# Patient Record
Sex: FEMALE | Race: WHITE | NOT HISPANIC OR LATINO | URBAN - METROPOLITAN AREA
[De-identification: names, ages, dates, MRNs, and addresses within clinical notes are randomized per-mention and may not be internally consistent; named-entity substitution may affect disease eponyms.]

---

## 2022-08-03 ENCOUNTER — WEB ENCOUNTER (OUTPATIENT)
Dept: URBAN - METROPOLITAN AREA CLINIC 63 | Facility: CLINIC | Age: 42
End: 2022-08-03

## 2022-08-04 ENCOUNTER — LAB OUTSIDE AN ENCOUNTER (OUTPATIENT)
Dept: URBAN - METROPOLITAN AREA CLINIC 63 | Facility: CLINIC | Age: 42
End: 2022-08-04

## 2022-08-04 ENCOUNTER — OFFICE VISIT (OUTPATIENT)
Dept: URBAN - METROPOLITAN AREA CLINIC 63 | Facility: CLINIC | Age: 42
End: 2022-08-04
Payer: COMMERCIAL

## 2022-08-04 VITALS
HEIGHT: 64 IN | HEART RATE: 82 BPM | WEIGHT: 137 LBS | SYSTOLIC BLOOD PRESSURE: 120 MMHG | OXYGEN SATURATION: 99 % | TEMPERATURE: 98.4 F | DIASTOLIC BLOOD PRESSURE: 82 MMHG | BODY MASS INDEX: 23.39 KG/M2

## 2022-08-04 DIAGNOSIS — K21.00 GASTROESOPHAGEAL REFLUX DISEASE WITH ESOPHAGITIS WITHOUT HEMORRHAGE: ICD-10-CM

## 2022-08-04 PROBLEM — 266433003: Status: ACTIVE | Noted: 2022-08-04

## 2022-08-04 PROCEDURE — 99203 OFFICE O/P NEW LOW 30 MIN: CPT | Performed by: INTERNAL MEDICINE

## 2022-08-04 RX ORDER — ESOMEPRAZOLE MAGNESIUM 20 MG/1
1 CAPSULE CAPSULE, DELAYED RELEASE ORAL ONCE A DAY
Status: ACTIVE | COMMUNITY

## 2022-08-04 RX ORDER — ALUMINUM HYDROXIDE AND MAGNESIUM CARBONATE 95; 358 MG/15ML; MG/15ML
15 ML AFTER MEALS AND AT BEDTIME AS NEEDED LIQUID ORAL
Status: ACTIVE | COMMUNITY

## 2022-09-21 ENCOUNTER — APPOINTMENT (RX ONLY)
Dept: URBAN - METROPOLITAN AREA CLINIC 328 | Facility: CLINIC | Age: 42
Setting detail: DERMATOLOGY
End: 2022-09-21

## 2022-09-21 DIAGNOSIS — L82.1 OTHER SEBORRHEIC KERATOSIS: ICD-10-CM

## 2022-09-21 DIAGNOSIS — L81.4 OTHER MELANIN HYPERPIGMENTATION: ICD-10-CM

## 2022-09-21 DIAGNOSIS — D22 MELANOCYTIC NEVI: ICD-10-CM

## 2022-09-21 DIAGNOSIS — D18.0 HEMANGIOMA: ICD-10-CM

## 2022-09-21 PROBLEM — D18.01 HEMANGIOMA OF SKIN AND SUBCUTANEOUS TISSUE: Status: ACTIVE | Noted: 2022-09-21

## 2022-09-21 PROBLEM — D22.5 MELANOCYTIC NEVI OF TRUNK: Status: ACTIVE | Noted: 2022-09-21

## 2022-09-21 PROBLEM — D48.5 NEOPLASM OF UNCERTAIN BEHAVIOR OF SKIN: Status: ACTIVE | Noted: 2022-09-21

## 2022-09-21 PROCEDURE — ? FULL BODY SKIN EXAM

## 2022-09-21 PROCEDURE — ? COUNSELING

## 2022-09-21 PROCEDURE — 99203 OFFICE O/P NEW LOW 30 MIN: CPT | Mod: 25

## 2022-09-21 PROCEDURE — ? TREATMENT REGIMEN

## 2022-09-21 PROCEDURE — ? ADDITIONAL NOTES

## 2022-09-21 PROCEDURE — ? BIOPSY BY PUNCH METHOD

## 2022-09-21 PROCEDURE — 11105 PUNCH BX SKIN EA SEP/ADDL: CPT

## 2022-09-21 PROCEDURE — 11104 PUNCH BX SKIN SINGLE LESION: CPT

## 2022-09-21 ASSESSMENT — LOCATION DETAILED DESCRIPTION DERM
LOCATION DETAILED: EPIGASTRIC SKIN
LOCATION DETAILED: LEFT MID-UPPER BACK
LOCATION DETAILED: LEFT MEDIAL MALAR CHEEK
LOCATION DETAILED: LEFT MEDIAL UPPER BACK
LOCATION DETAILED: RIGHT SUPERIOR MEDIAL MIDBACK
LOCATION DETAILED: RIGHT PERIAREOLAR BREAST 11-12:00 REGION
LOCATION DETAILED: LEFT ANTERIOR SHOULDER
LOCATION DETAILED: LEFT INFERIOR CENTRAL MALAR CHEEK

## 2022-09-21 ASSESSMENT — LOCATION SIMPLE DESCRIPTION DERM
LOCATION SIMPLE: LEFT SHOULDER
LOCATION SIMPLE: LEFT UPPER BACK
LOCATION SIMPLE: ABDOMEN
LOCATION SIMPLE: RIGHT LOWER BACK
LOCATION SIMPLE: LEFT CHEEK
LOCATION SIMPLE: RIGHT BREAST

## 2022-09-21 ASSESSMENT — LOCATION ZONE DERM
LOCATION ZONE: ARM
LOCATION ZONE: TRUNK
LOCATION ZONE: FACE

## 2022-09-21 NOTE — PROCEDURE: BIOPSY BY PUNCH METHOD
Detail Level: Detailed
Was A Bandage Applied: Yes
Punch Size In Mm: 2
Biopsy Type: H and E
Anesthesia Type: 1% lidocaine with epinephrine
Anesthesia Volume In Cc (Will Not Render If 0): 1
Additional Anesthesia Volume In Cc (Will Not Render If 0): 0
Hemostasis: None
Epidermal Sutures: gel foam
Wound Care: Vaseline
Dressing: bandage
Patient Will Remove Sutures At Home?: No
Lab: 6
Lab Facility: [id number]
Consent: Verbal consent was obtained and risks were reviewed including but not limited to scarring, infection, bleeding, scabbing, incomplete removal, nerve damage and allergy to anesthesia.
Post-Care Instructions: 1. Keep the wound dry and covered with a bandage for the first 24 hours after procedure. Thereafter, change the bandage twice a day.  Gently clean the wound with water and then gently pat the wound dry. Gently apply a thick layer of Vaseline to the wound and cover with a bandage, 2 days after your biopsy.  If the bandage gets wet, replace it with a dry bandage. For facial biopsies, the area may remain uncovered after 3-4 days. For other areas of the body, we recommend covering the wound for 7 days. After 7 days, the wound can remain uncovered.\\n \\n2. We do not recommend Neosporin in our practice due to the percentage of the population that can develop an allergy to it.\\n \\n3. Showering is fine; if the bandage gets wet, just replace it with a dry bandage (no soaking in a bathtub or a pool for 5 days).  \\n\\n4.  If bruising is a concern, Arnica is a supplement that minimizes bruising.  To prevent bruising, the supplement can be taken once daily for 1 week prior to the procedure.  This can also be taken starting on the same day as the procedure to minimize bruising and quicken the healing process if bruises do appear.  If starting on the same day as the procedure, please follow instructions on the package.  This supplement is available at local vitamin shops. \\n\\n5. Your results may take up to 14 days to come in. Our office will call you with results at this time. In some cases, the biopsy will indicate that more skin must be removed in order to remove abnormal cells.  If this is the case, you will then be asked to return to the office for additional information and/or treatment.\\n\\n\\nOnce the wound has healed, to help with minimizing the scar, a product called Scar Recovery Gel can be applied twice a day to the area.  This product will decrease the likelihood of the formation of a hypertrophic scar, and decrease the appearance of red or pink pigment changes in the scar.  The gel has also been shown to significantly decrease itching while the wound heals. Your provider will discuss this product with you after your biopsy results have come in.\\n \\n** Call us if you experience any problems or have any questions.** If it is after hours and you are having bleeding or problems with your wound, please go to the nearest emergency room and Dr. Mahan will be contacted as indicated.
Home Suture Removal Text: Patient was provided a home suture removal kit and will remove their sutures at home.  If they have any questions or difficulties they will call the office.
Notification Instructions: Patient will be notified of biopsy results. However, patient instructed to call the office if not contacted within 2 weeks.
Billing Type: Third-Party Bill
Information: Selecting Yes will display possible errors in your note based on the variables you have selected. This validation is only offered as a suggestion for you. PLEASE NOTE THAT THE VALIDATION TEXT WILL BE REMOVED WHEN YOU FINALIZE YOUR NOTE. IF YOU WANT TO FAX A PRELIMINARY NOTE YOU WILL NEED TO TOGGLE THIS TO 'NO' IF YOU DO NOT WANT IT IN YOUR FAXED NOTE.
Post-Care Instructions: 1. Keep the wound dry and covered with a bandage for the first 24 hours after procedure. Thereafter, change the bandage twice a day.  Gently clean the wound with water and then gently pat the wound dry. Gently apply a thick layer of Vaseline to the wound and cover with a bandage, 2 days after your biopsy.  If the bandage gets wet, replace it with a dry bandage. For facial biopsies, the area may remain uncovered after 3-4 days. For other areas of the body, we recommend covering the wound for 7 days. After 7 days, the wound can remain uncovered.\\n \\n2. We do not recommend Neosporin in our practice due to the percentage of the population that can develop an allergy to it.\\n \\n3. Showering is fine; if the bandage gets wet, just replace it with a dry bandage (no soaking in a bathtub or a pool for 5 days).  \\n\\n4.  If bruising is a concern, Arnica is a supplement that minimizes bruising.  To prevent bruising, the supplement can be taken once daily for 1 week prior to the procedure.  This can also be taken starting on the same day as the procedure to minimize bruising and quicken the healing process if bruises do appear.  If starting on the same day as the procedure, please follow instructions on the package.  This supplement is available at local vitamin shops. \\n\\n5. Your results may take up to 14 days to come in. Our office will call you with results at this time. In some cases, the biopsy will indicate that more skin must be removed in order to remove abnormal cells.  If this is the case, you will then be asked to return to the office for additional information and/or treatment.\\n\\n\\nOnce the wound has healed, to help with minimizing the scar, a product called Scar Recovery Gel can be applied twice a day to the area.  This product will decrease the likelihood of the formation of a hypertrophic scar, and decrease the appearance of red or pink pigment changes in the scar.  The gel has also been shown to significantly decrease itching while the wound heals. Your provider will discuss this product with you after your biopsy results have come in.\\n \\n** Call us if you experience any problems or have any questions.** If it is after hours and you are having bleeding or problems with your wound, please go to the nearest emergency room and Dr. Mahan will be contacted as indicated.
Post-Care Instructions: 1. Keep the wound dry and covered with a bandage for the first 24 hours after procedure. Thereafter, change the bandage twice a day.  Gently clean the wound with water and then gently pat the wound dry. Gently apply a thick layer of Vaseline to the wound and cover with a bandage, 2 days after your biopsy.  If the bandage gets wet, replace it with a dry bandage. For facial biopsies, the area may remain uncovered after 3-4 days. For other areas of the body, we recommend covering the wound for 7 days. After 7 days, the wound can remain uncovered.\\n \\n2. We do not recommend Neosporin in our practice due to the percentage of the population that can develop an allergy to it.\\n \\n3. Showering is fine; if the bandage gets wet, just replace it with a dry bandage (no soaking in a bathtub or a pool for 5 days).  \\n\\n4.  If bruising is a concern, Arnica is a supplement that minimizes bruising.  To prevent bruising, the supplement can be taken once daily for 1 week prior to the procedure.  This can also be taken starting on the same day as the procedure to minimize bruising and quicken the healing process if bruises do appear.  If starting on the same day as the procedure, please follow instructions on the package.  This supplement is available at local vitamin shops. \\n\\n5. Your results may take up to 14 days to come in. Our office will call you with results at this time. In some cases, the biopsy will indicate that more skin must be removed in order to remove abnormal cells.  If this is the case, you will then be asked to return to the office for additional information and/or treatment.\\n\\n\\nOnce the wound has healed, to help with minimizing the scar, a product called Scar Recovery Gel can be applied twice a day to the area.  This product will decrease the likelihood of the formation of a hypertrophic scar, and decrease the appearance of red or pink pigment changes in the scar.  The gel has also been shown to significantly decrease itching while the wound heals. Your provider will discuss this product with you after your biopsy results have come in.\\n \\n** Call us if you experience any problems or have any questions.** If it is after hours and you are having bleeding or problems with your wound, please go to the nearest emergency room and Dr. Mahan will be contacted as indicated.

## 2022-10-28 ENCOUNTER — TELEPHONE ENCOUNTER (OUTPATIENT)
Dept: URBAN - METROPOLITAN AREA CLINIC 63 | Facility: CLINIC | Age: 42
End: 2022-10-28

## 2023-01-06 ENCOUNTER — OFFICE VISIT (OUTPATIENT)
Dept: URBAN - METROPOLITAN AREA MEDICAL CENTER 14 | Facility: MEDICAL CENTER | Age: 43
End: 2023-01-06
Payer: COMMERCIAL

## 2023-01-06 DIAGNOSIS — K21.9 GASTROESOPHAGEAL REFLUX DISEASE: ICD-10-CM

## 2023-01-06 DIAGNOSIS — K44.9 HIATAL HERNIA: ICD-10-CM

## 2023-01-06 DIAGNOSIS — K20.90 ESOPHAGITIS: ICD-10-CM

## 2023-01-06 DIAGNOSIS — K29.00 ACUTE GASTRITIS WITHOUT BLEEDING: ICD-10-CM

## 2023-01-06 PROCEDURE — 43239 EGD BIOPSY SINGLE/MULTIPLE: CPT | Performed by: INTERNAL MEDICINE

## 2023-01-09 ENCOUNTER — APPOINTMENT (RX ONLY)
Dept: URBAN - METROPOLITAN AREA CLINIC 334 | Facility: CLINIC | Age: 43
Setting detail: DERMATOLOGY
End: 2023-01-09

## 2023-01-09 DIAGNOSIS — D22 MELANOCYTIC NEVI: ICD-10-CM

## 2023-01-09 PROBLEM — D22.5 MELANOCYTIC NEVI OF TRUNK: Status: ACTIVE | Noted: 2023-01-09

## 2023-01-09 PROBLEM — 235595009 GASTROESOPHAGEAL REFLUX DISEASE: Status: ACTIVE | Noted: 2023-01-09

## 2023-01-09 PROCEDURE — 99212 OFFICE O/P EST SF 10 MIN: CPT | Mod: 25

## 2023-01-09 PROCEDURE — ? SHAVE REMOVAL

## 2023-01-09 PROCEDURE — ? COUNSELING

## 2023-01-09 PROCEDURE — ? REFERRAL

## 2023-01-09 PROCEDURE — 11300 SHAVE SKIN LESION 0.5 CM/<: CPT

## 2023-01-09 ASSESSMENT — LOCATION SIMPLE DESCRIPTION DERM
LOCATION SIMPLE: LEFT UPPER BACK
LOCATION SIMPLE: RIGHT BREAST

## 2023-01-09 ASSESSMENT — LOCATION ZONE DERM: LOCATION ZONE: TRUNK

## 2023-01-09 ASSESSMENT — LOCATION DETAILED DESCRIPTION DERM
LOCATION DETAILED: LEFT MEDIAL UPPER BACK
LOCATION DETAILED: RIGHT PERIAREOLAR BREAST 1-2:00 REGION

## 2023-01-18 ENCOUNTER — WEB ENCOUNTER (OUTPATIENT)
Dept: URBAN - METROPOLITAN AREA CLINIC 63 | Facility: CLINIC | Age: 43
End: 2023-01-18

## 2023-01-24 ENCOUNTER — OFFICE VISIT (OUTPATIENT)
Dept: URBAN - METROPOLITAN AREA CLINIC 63 | Facility: CLINIC | Age: 43
End: 2023-01-24
Payer: COMMERCIAL

## 2023-01-24 ENCOUNTER — WEB ENCOUNTER (OUTPATIENT)
Dept: URBAN - METROPOLITAN AREA CLINIC 63 | Facility: CLINIC | Age: 43
End: 2023-01-24

## 2023-01-24 ENCOUNTER — DASHBOARD ENCOUNTERS (OUTPATIENT)
Age: 43
End: 2023-01-24

## 2023-01-24 VITALS
BODY MASS INDEX: 23.25 KG/M2 | HEART RATE: 84 BPM | HEIGHT: 64 IN | WEIGHT: 136.2 LBS | SYSTOLIC BLOOD PRESSURE: 118 MMHG | TEMPERATURE: 97.1 F | DIASTOLIC BLOOD PRESSURE: 80 MMHG | RESPIRATION RATE: 99 BRPM

## 2023-01-24 DIAGNOSIS — K21.9 GASTROESOPHAGEAL REFLUX DISEASE, UNSPECIFIED WHETHER ESOPHAGITIS PRESENT: ICD-10-CM

## 2023-01-24 PROBLEM — 235595009: Status: ACTIVE | Noted: 2023-01-24

## 2023-01-24 PROCEDURE — 99213 OFFICE O/P EST LOW 20 MIN: CPT | Performed by: INTERNAL MEDICINE

## 2023-01-24 RX ORDER — ALUMINUM HYDROXIDE AND MAGNESIUM CARBONATE 95; 358 MG/15ML; MG/15ML
15 ML AFTER MEALS AND AT BEDTIME AS NEEDED LIQUID ORAL
Status: ACTIVE | COMMUNITY

## 2023-01-24 RX ORDER — ESOMEPRAZOLE MAGNESIUM 20 MG/1
1 CAPSULE CAPSULE, DELAYED RELEASE ORAL ONCE A DAY
Status: ACTIVE | COMMUNITY

## 2023-01-24 RX ORDER — BUPROPION HYDROCHLORIDE 300 MG/1
1 TABLET IN THE MORNING TABLET, EXTENDED RELEASE ORAL ONCE A DAY
Status: ACTIVE | COMMUNITY

## 2023-01-24 NOTE — HPI-TODAY'S VISIT:
Patient had an EGD performed in the hospital there is only mild gastritis and a 2 cm hiatal hernia appreciated.  Biopsies of the stomach were negative for Helicobacter and biopsies of the distal esophagus were negative for Soto's.

## 2023-07-10 ENCOUNTER — APPOINTMENT (RX ONLY)
Dept: URBAN - METROPOLITAN AREA CLINIC 334 | Facility: CLINIC | Age: 43
Setting detail: DERMATOLOGY
End: 2023-07-10

## 2023-07-10 DIAGNOSIS — L81.4 OTHER MELANIN HYPERPIGMENTATION: ICD-10-CM

## 2023-07-10 DIAGNOSIS — Z87.2 PERSONAL HISTORY OF DISEASES OF THE SKIN AND SUBCUTANEOUS TISSUE: ICD-10-CM

## 2023-07-10 DIAGNOSIS — L82.1 OTHER SEBORRHEIC KERATOSIS: ICD-10-CM

## 2023-07-10 DIAGNOSIS — D22 MELANOCYTIC NEVI: ICD-10-CM

## 2023-07-10 DIAGNOSIS — B36.0 PITYRIASIS VERSICOLOR: ICD-10-CM

## 2023-07-10 DIAGNOSIS — D18.0 HEMANGIOMA: ICD-10-CM

## 2023-07-10 DIAGNOSIS — Z12.83 ENCOUNTER FOR SCREENING FOR MALIGNANT NEOPLASM OF SKIN: ICD-10-CM

## 2023-07-10 PROBLEM — D18.01 HEMANGIOMA OF SKIN AND SUBCUTANEOUS TISSUE: Status: ACTIVE | Noted: 2023-07-10

## 2023-07-10 PROBLEM — D48.5 NEOPLASM OF UNCERTAIN BEHAVIOR OF SKIN: Status: ACTIVE | Noted: 2023-07-10

## 2023-07-10 PROCEDURE — ? BIOPSY BY SHAVE METHOD

## 2023-07-10 PROCEDURE — ? PRESCRIPTION MEDICATION MANAGEMENT

## 2023-07-10 PROCEDURE — ? PRESCRIPTION

## 2023-07-10 PROCEDURE — ? TREATMENT REGIMEN

## 2023-07-10 PROCEDURE — 99213 OFFICE O/P EST LOW 20 MIN: CPT | Mod: 25

## 2023-07-10 PROCEDURE — ? FULL BODY SKIN EXAM

## 2023-07-10 PROCEDURE — 11102 TANGNTL BX SKIN SINGLE LES: CPT

## 2023-07-10 PROCEDURE — ? COUNSELING

## 2023-07-10 RX ORDER — KETOCONAZOLE 20 MG/ML
SHAMPOO, SUSPENSION TOPICAL
Qty: 120 | Refills: 5 | Status: ERX | COMMUNITY
Start: 2023-07-10

## 2023-07-10 RX ADMIN — KETOCONAZOLE: 20 SHAMPOO, SUSPENSION TOPICAL at 00:00

## 2023-07-10 ASSESSMENT — LOCATION DETAILED DESCRIPTION DERM
LOCATION DETAILED: RIGHT PROXIMAL RADIAL DORSAL FOREARM
LOCATION DETAILED: LEFT MEDIAL UPPER BACK
LOCATION DETAILED: RIGHT INFERIOR MEDIAL UPPER BACK
LOCATION DETAILED: UPPER STERNUM
LOCATION DETAILED: PERIUMBILICAL SKIN

## 2023-07-10 ASSESSMENT — LOCATION ZONE DERM
LOCATION ZONE: ARM
LOCATION ZONE: TRUNK

## 2023-07-10 ASSESSMENT — LOCATION SIMPLE DESCRIPTION DERM
LOCATION SIMPLE: RIGHT FOREARM
LOCATION SIMPLE: LEFT UPPER BACK
LOCATION SIMPLE: CHEST
LOCATION SIMPLE: RIGHT UPPER BACK
LOCATION SIMPLE: ABDOMEN

## 2024-01-10 ENCOUNTER — APPOINTMENT (RX ONLY)
Dept: URBAN - METROPOLITAN AREA CLINIC 334 | Facility: CLINIC | Age: 44
Setting detail: DERMATOLOGY
End: 2024-01-10

## 2024-01-10 DIAGNOSIS — L82.1 OTHER SEBORRHEIC KERATOSIS: ICD-10-CM

## 2024-01-10 DIAGNOSIS — Z87.2 PERSONAL HISTORY OF DISEASES OF THE SKIN AND SUBCUTANEOUS TISSUE: ICD-10-CM

## 2024-01-10 DIAGNOSIS — D18.0 HEMANGIOMA: ICD-10-CM

## 2024-01-10 DIAGNOSIS — L81.4 OTHER MELANIN HYPERPIGMENTATION: ICD-10-CM

## 2024-01-10 DIAGNOSIS — Z12.83 ENCOUNTER FOR SCREENING FOR MALIGNANT NEOPLASM OF SKIN: ICD-10-CM

## 2024-01-10 PROBLEM — D18.01 HEMANGIOMA OF SKIN AND SUBCUTANEOUS TISSUE: Status: ACTIVE | Noted: 2024-01-10

## 2024-01-10 PROCEDURE — ? FULL BODY SKIN EXAM

## 2024-01-10 PROCEDURE — ? COUNSELING

## 2024-01-10 PROCEDURE — ? TREATMENT REGIMEN

## 2024-01-10 PROCEDURE — 99213 OFFICE O/P EST LOW 20 MIN: CPT

## 2024-01-10 ASSESSMENT — LOCATION DETAILED DESCRIPTION DERM
LOCATION DETAILED: RIGHT INFERIOR MEDIAL UPPER BACK
LOCATION DETAILED: UPPER STERNUM
LOCATION DETAILED: PERIUMBILICAL SKIN

## 2024-01-10 ASSESSMENT — LOCATION SIMPLE DESCRIPTION DERM
LOCATION SIMPLE: ABDOMEN
LOCATION SIMPLE: RIGHT UPPER BACK
LOCATION SIMPLE: CHEST

## 2024-01-10 ASSESSMENT — LOCATION ZONE DERM: LOCATION ZONE: TRUNK

## 2024-09-30 ENCOUNTER — APPOINTMENT (RX ONLY)
Dept: URBAN - METROPOLITAN AREA CLINIC 334 | Facility: CLINIC | Age: 44
Setting detail: DERMATOLOGY
End: 2024-09-30

## 2024-09-30 DIAGNOSIS — L81.4 OTHER MELANIN HYPERPIGMENTATION: ICD-10-CM

## 2024-09-30 DIAGNOSIS — Z87.2 PERSONAL HISTORY OF DISEASES OF THE SKIN AND SUBCUTANEOUS TISSUE: ICD-10-CM

## 2024-09-30 DIAGNOSIS — L82.1 OTHER SEBORRHEIC KERATOSIS: ICD-10-CM

## 2024-09-30 DIAGNOSIS — L90.5 SCAR CONDITIONS AND FIBROSIS OF SKIN: ICD-10-CM | Status: STABLE

## 2024-09-30 DIAGNOSIS — Z12.83 ENCOUNTER FOR SCREENING FOR MALIGNANT NEOPLASM OF SKIN: ICD-10-CM

## 2024-09-30 DIAGNOSIS — D18.0 HEMANGIOMA: ICD-10-CM

## 2024-09-30 PROBLEM — D18.01 HEMANGIOMA OF SKIN AND SUBCUTANEOUS TISSUE: Status: ACTIVE | Noted: 2024-09-30

## 2024-09-30 PROBLEM — D23.39 OTHER BENIGN NEOPLASM OF SKIN OF OTHER PARTS OF FACE: Status: ACTIVE | Noted: 2024-09-30

## 2024-09-30 PROCEDURE — 99213 OFFICE O/P EST LOW 20 MIN: CPT

## 2024-09-30 PROCEDURE — ? TREATMENT REGIMEN

## 2024-09-30 PROCEDURE — ? FULL BODY SKIN EXAM

## 2024-09-30 PROCEDURE — ? BENIGN DESTRUCTION COSMETIC

## 2024-09-30 PROCEDURE — ? RECOMMENDATIONS

## 2024-09-30 PROCEDURE — ? COUNSELING

## 2024-09-30 ASSESSMENT — LOCATION SIMPLE DESCRIPTION DERM
LOCATION SIMPLE: RIGHT UPPER BACK
LOCATION SIMPLE: RIGHT FOREARM
LOCATION SIMPLE: ABDOMEN
LOCATION SIMPLE: CHEST
LOCATION SIMPLE: RIGHT BREAST

## 2024-09-30 ASSESSMENT — LOCATION DETAILED DESCRIPTION DERM
LOCATION DETAILED: RIGHT PROXIMAL DORSAL FOREARM
LOCATION DETAILED: RIGHT AREOLA
LOCATION DETAILED: PERIUMBILICAL SKIN
LOCATION DETAILED: RIGHT INFERIOR MEDIAL UPPER BACK
LOCATION DETAILED: UPPER STERNUM

## 2024-09-30 ASSESSMENT — LOCATION ZONE DERM
LOCATION ZONE: ARM
LOCATION ZONE: TRUNK

## 2024-09-30 NOTE — PROCEDURE: RECOMMENDATIONS
Render Risk Assessment In Note?: no
Recommendation Preamble: The following recommendations were made during the visit: massaging the scar
Detail Level: Zone

## 2024-09-30 NOTE — HPI: SKIN LESION
What Type Of Note Output Would You Prefer (Optional)?: Bullet Format
How Severe Is Your Skin Lesion?: mild
Has Your Skin Lesion Been Treated?: not been treated
Is This A New Presentation, Or A Follow-Up?: Skin Lesion
Which Family Member (Optional)?: Grandpa

## 2024-09-30 NOTE — HPI: EVALUATION OF SKIN LESION(S)
What Type Of Note Output Would You Prefer (Optional)?: Bullet Format
Hpi Title: Evaluation of Skin Lesions
Family Member: Grandpa
Additional History: Spot on back itches and spot on right forearm from caro biopsy

## 2024-09-30 NOTE — PROCEDURE: BENIGN DESTRUCTION COSMETIC
Detail Level: Simple
Topical Anesthesia?: BLT cream (benzocaine 20%, lidocaine 10%, tetracaine 10%)
Post-Care Instructions: I reviewed with the patient in detail post-care instructions. Patient is to wear sunprotection, and avoid picking at any of the treated lesions. Pt may apply Vaseline to crusted or scabbing areas.
Consent: The patient's consent was obtained including but not limited to risks of crusting, scabbing, blistering, scarring, darker or lighter pigmentary change, recurrence, incomplete removal and infection.
Anesthesia Volume In Cc: 0.5
Price (Use Numbers Only, No Special Characters Or $): 125